# Patient Record
Sex: MALE | NOT HISPANIC OR LATINO
[De-identification: names, ages, dates, MRNs, and addresses within clinical notes are randomized per-mention and may not be internally consistent; named-entity substitution may affect disease eponyms.]

---

## 2018-11-12 ENCOUNTER — APPOINTMENT (OUTPATIENT)
Dept: OPHTHALMOLOGY | Facility: CLINIC | Age: 83
End: 2018-11-12
Payer: MEDICAID

## 2018-11-12 PROCEDURE — 99203 OFFICE O/P NEW LOW 30 MIN: CPT

## 2018-11-19 ENCOUNTER — APPOINTMENT (OUTPATIENT)
Dept: OPHTHALMOLOGY | Facility: CLINIC | Age: 83
End: 2018-11-19

## 2018-11-26 ENCOUNTER — APPOINTMENT (OUTPATIENT)
Dept: OPHTHALMOLOGY | Facility: CLINIC | Age: 83
End: 2018-11-26
Payer: MEDICAID

## 2018-11-26 PROCEDURE — 92136 OPHTHALMIC BIOMETRY: CPT

## 2018-11-26 PROCEDURE — 92014 COMPRE OPH EXAM EST PT 1/>: CPT

## 2019-01-02 ENCOUNTER — APPOINTMENT (OUTPATIENT)
Dept: OPHTHALMOLOGY | Facility: CLINIC | Age: 84
End: 2019-01-02
Payer: MEDICAID

## 2019-01-02 PROCEDURE — 92014 COMPRE OPH EXAM EST PT 1/>: CPT

## 2019-03-26 ENCOUNTER — APPOINTMENT (OUTPATIENT)
Dept: OPHTHALMOLOGY | Facility: CLINIC | Age: 84
End: 2019-03-26
Payer: MEDICAID

## 2019-03-26 PROCEDURE — 92014 COMPRE OPH EXAM EST PT 1/>: CPT

## 2019-04-05 ENCOUNTER — APPOINTMENT (OUTPATIENT)
Dept: OPHTHALMOLOGY | Facility: EYE CENTER | Age: 84
End: 2019-04-05
Payer: MEDICAID

## 2019-04-05 PROCEDURE — 66984 XCAPSL CTRC RMVL W/O ECP: CPT | Mod: RT

## 2019-04-06 ENCOUNTER — APPOINTMENT (OUTPATIENT)
Dept: OPHTHALMOLOGY | Facility: CLINIC | Age: 84
End: 2019-04-06
Payer: MEDICAID

## 2019-04-06 PROCEDURE — 99024 POSTOP FOLLOW-UP VISIT: CPT

## 2019-04-10 ENCOUNTER — APPOINTMENT (OUTPATIENT)
Dept: OPHTHALMOLOGY | Facility: CLINIC | Age: 84
End: 2019-04-10
Payer: MEDICAID

## 2019-04-10 PROCEDURE — 99024 POSTOP FOLLOW-UP VISIT: CPT

## 2019-04-19 ENCOUNTER — NON-APPOINTMENT (OUTPATIENT)
Age: 84
End: 2019-04-19

## 2019-04-19 ENCOUNTER — APPOINTMENT (OUTPATIENT)
Dept: OPHTHALMOLOGY | Facility: EYE CENTER | Age: 84
End: 2019-04-19
Payer: MEDICAID

## 2019-04-19 PROCEDURE — 66984 XCAPSL CTRC RMVL W/O ECP: CPT | Mod: LT,79

## 2019-04-20 ENCOUNTER — NON-APPOINTMENT (OUTPATIENT)
Age: 84
End: 2019-04-20

## 2019-04-20 ENCOUNTER — APPOINTMENT (OUTPATIENT)
Dept: OPHTHALMOLOGY | Facility: CLINIC | Age: 84
End: 2019-04-20
Payer: MEDICAID

## 2019-04-20 PROCEDURE — 99024 POSTOP FOLLOW-UP VISIT: CPT

## 2019-04-29 ENCOUNTER — NON-APPOINTMENT (OUTPATIENT)
Age: 84
End: 2019-04-29

## 2019-04-29 ENCOUNTER — APPOINTMENT (OUTPATIENT)
Dept: OPHTHALMOLOGY | Facility: CLINIC | Age: 84
End: 2019-04-29
Payer: MEDICAID

## 2019-04-29 PROCEDURE — 99024 POSTOP FOLLOW-UP VISIT: CPT

## 2019-05-29 ENCOUNTER — APPOINTMENT (OUTPATIENT)
Dept: OPHTHALMOLOGY | Facility: CLINIC | Age: 84
End: 2019-05-29
Payer: MEDICAID

## 2019-05-29 ENCOUNTER — NON-APPOINTMENT (OUTPATIENT)
Age: 84
End: 2019-05-29

## 2019-05-29 PROCEDURE — 99024 POSTOP FOLLOW-UP VISIT: CPT

## 2019-05-29 PROCEDURE — 92015 DETERMINE REFRACTIVE STATE: CPT

## 2019-06-01 ENCOUNTER — OUTPATIENT (OUTPATIENT)
Dept: OUTPATIENT SERVICES | Facility: HOSPITAL | Age: 84
LOS: 1 days | End: 2019-06-01
Payer: MEDICAID

## 2019-06-01 PROCEDURE — G9001: CPT

## 2019-06-02 ENCOUNTER — EMERGENCY (EMERGENCY)
Facility: HOSPITAL | Age: 84
LOS: 1 days | Discharge: HOME | End: 2019-06-02
Attending: EMERGENCY MEDICINE | Admitting: INTERNAL MEDICINE
Payer: MEDICAID

## 2019-06-02 VITALS
RESPIRATION RATE: 18 BRPM | HEART RATE: 94 BPM | SYSTOLIC BLOOD PRESSURE: 151 MMHG | DIASTOLIC BLOOD PRESSURE: 90 MMHG | OXYGEN SATURATION: 94 % | TEMPERATURE: 98 F

## 2019-06-02 DIAGNOSIS — J44.1 CHRONIC OBSTRUCTIVE PULMONARY DISEASE WITH (ACUTE) EXACERBATION: ICD-10-CM

## 2019-06-02 LAB
ALBUMIN SERPL ELPH-MCNC: 4.1 G/DL — SIGNIFICANT CHANGE UP (ref 3.3–5)
ALP SERPL-CCNC: 56 U/L — SIGNIFICANT CHANGE UP (ref 40–120)
ALT FLD-CCNC: 11 U/L — SIGNIFICANT CHANGE UP (ref 4–41)
ANION GAP SERPL CALC-SCNC: 14 MMO/L — SIGNIFICANT CHANGE UP (ref 7–14)
AST SERPL-CCNC: 15 U/L — SIGNIFICANT CHANGE UP (ref 4–40)
BASE EXCESS BLDV CALC-SCNC: 1 MMOL/L — SIGNIFICANT CHANGE UP
BASOPHILS # BLD AUTO: 0.04 K/UL — SIGNIFICANT CHANGE UP (ref 0–0.2)
BASOPHILS NFR BLD AUTO: 0.4 % — SIGNIFICANT CHANGE UP (ref 0–2)
BILIRUB SERPL-MCNC: 0.8 MG/DL — SIGNIFICANT CHANGE UP (ref 0.2–1.2)
BLOOD GAS VENOUS - CREATININE: 0.93 MG/DL — SIGNIFICANT CHANGE UP (ref 0.5–1.3)
BLOOD GAS VENOUS - FIO2: 21 — SIGNIFICANT CHANGE UP
BUN SERPL-MCNC: 11 MG/DL — SIGNIFICANT CHANGE UP (ref 7–23)
CALCIUM SERPL-MCNC: 9.6 MG/DL — SIGNIFICANT CHANGE UP (ref 8.4–10.5)
CHLORIDE BLDV-SCNC: 96 MMOL/L — SIGNIFICANT CHANGE UP (ref 96–108)
CHLORIDE SERPL-SCNC: 95 MMOL/L — LOW (ref 98–107)
CO2 SERPL-SCNC: 22 MMOL/L — SIGNIFICANT CHANGE UP (ref 22–31)
CREAT SERPL-MCNC: 0.95 MG/DL — SIGNIFICANT CHANGE UP (ref 0.5–1.3)
EOSINOPHIL # BLD AUTO: 0.04 K/UL — SIGNIFICANT CHANGE UP (ref 0–0.5)
EOSINOPHIL NFR BLD AUTO: 0.4 % — SIGNIFICANT CHANGE UP (ref 0–6)
GAS PNL BLDV: 129 MMOL/L — LOW (ref 136–146)
GLUCOSE BLDV-MCNC: 94 MG/DL — SIGNIFICANT CHANGE UP (ref 70–99)
GLUCOSE SERPL-MCNC: 95 MG/DL — SIGNIFICANT CHANGE UP (ref 70–99)
HCO3 BLDV-SCNC: 24 MMOL/L — SIGNIFICANT CHANGE UP (ref 20–27)
HCT VFR BLD CALC: 41.5 % — SIGNIFICANT CHANGE UP (ref 39–50)
HCT VFR BLDV CALC: 46.3 % — SIGNIFICANT CHANGE UP (ref 39–51)
HGB BLD-MCNC: 14 G/DL — SIGNIFICANT CHANGE UP (ref 13–17)
HGB BLDV-MCNC: 15.1 G/DL — SIGNIFICANT CHANGE UP (ref 13–17)
IMM GRANULOCYTES NFR BLD AUTO: 0.2 % — SIGNIFICANT CHANGE UP (ref 0–1.5)
LACTATE BLDV-MCNC: 1.4 MMOL/L — SIGNIFICANT CHANGE UP (ref 0.5–2)
LYMPHOCYTES # BLD AUTO: 1.4 K/UL — SIGNIFICANT CHANGE UP (ref 1–3.3)
LYMPHOCYTES # BLD AUTO: 14.2 % — SIGNIFICANT CHANGE UP (ref 13–44)
MCHC RBC-ENTMCNC: 30.2 PG — SIGNIFICANT CHANGE UP (ref 27–34)
MCHC RBC-ENTMCNC: 33.7 % — SIGNIFICANT CHANGE UP (ref 32–36)
MCV RBC AUTO: 89.4 FL — SIGNIFICANT CHANGE UP (ref 80–100)
MONOCYTES # BLD AUTO: 1.35 K/UL — HIGH (ref 0–0.9)
MONOCYTES NFR BLD AUTO: 13.7 % — SIGNIFICANT CHANGE UP (ref 2–14)
NEUTROPHILS # BLD AUTO: 7.02 K/UL — SIGNIFICANT CHANGE UP (ref 1.8–7.4)
NEUTROPHILS NFR BLD AUTO: 71.1 % — SIGNIFICANT CHANGE UP (ref 43–77)
NRBC # FLD: 0 K/UL — SIGNIFICANT CHANGE UP (ref 0–0)
PCO2 BLDV: 44 MMHG — SIGNIFICANT CHANGE UP (ref 41–51)
PH BLDV: 7.38 PH — SIGNIFICANT CHANGE UP (ref 7.32–7.43)
PLATELET # BLD AUTO: 210 K/UL — SIGNIFICANT CHANGE UP (ref 150–400)
PMV BLD: 8.9 FL — SIGNIFICANT CHANGE UP (ref 7–13)
PO2 BLDV: 27 MMHG — LOW (ref 35–40)
POTASSIUM BLDV-SCNC: 3.8 MMOL/L — SIGNIFICANT CHANGE UP (ref 3.4–4.5)
POTASSIUM SERPL-MCNC: 4.1 MMOL/L — SIGNIFICANT CHANGE UP (ref 3.5–5.3)
POTASSIUM SERPL-SCNC: 4.1 MMOL/L — SIGNIFICANT CHANGE UP (ref 3.5–5.3)
PROT SERPL-MCNC: 6.8 G/DL — SIGNIFICANT CHANGE UP (ref 6–8.3)
RBC # BLD: 4.64 M/UL — SIGNIFICANT CHANGE UP (ref 4.2–5.8)
RBC # FLD: 12.8 % — SIGNIFICANT CHANGE UP (ref 10.3–14.5)
SAO2 % BLDV: 43.3 % — LOW (ref 60–85)
SODIUM SERPL-SCNC: 131 MMOL/L — LOW (ref 135–145)
TROPONIN T, HIGH SENSITIVITY: 24 NG/L — SIGNIFICANT CHANGE UP (ref ?–14)
TROPONIN T, HIGH SENSITIVITY: 26 NG/L — SIGNIFICANT CHANGE UP (ref ?–14)
WBC # BLD: 9.87 K/UL — SIGNIFICANT CHANGE UP (ref 3.8–10.5)
WBC # FLD AUTO: 9.87 K/UL — SIGNIFICANT CHANGE UP (ref 3.8–10.5)

## 2019-06-02 PROCEDURE — 99285 EMERGENCY DEPT VISIT HI MDM: CPT

## 2019-06-02 RX ORDER — IPRATROPIUM/ALBUTEROL SULFATE 18-103MCG
3 AEROSOL WITH ADAPTER (GRAM) INHALATION ONCE
Refills: 0 | Status: COMPLETED | OUTPATIENT
Start: 2019-06-02 | End: 2019-06-02

## 2019-06-02 RX ORDER — SODIUM CHLORIDE 9 MG/ML
1000 INJECTION INTRAMUSCULAR; INTRAVENOUS; SUBCUTANEOUS ONCE
Refills: 0 | Status: COMPLETED | OUTPATIENT
Start: 2019-06-02 | End: 2019-06-02

## 2019-06-02 RX ADMIN — Medication 3 MILLILITER(S): at 20:32

## 2019-06-02 RX ADMIN — SODIUM CHLORIDE 1000 MILLILITER(S): 9 INJECTION INTRAMUSCULAR; INTRAVENOUS; SUBCUTANEOUS at 20:31

## 2019-06-02 RX ADMIN — Medication 125 MILLIGRAM(S): at 20:31

## 2019-06-02 NOTE — H&P ADULT - NSICDXPASTMEDICALHX_GEN_ALL_CORE_FT
PAST MEDICAL HISTORY:  BPH (benign prostatic hyperplasia)     COPD (chronic obstructive pulmonary disease)     Zenkers diverticulum

## 2019-06-02 NOTE — H&P ADULT - PROBLEM SELECTOR PLAN 5
-pt requesting daily b12 injections to help with generalized weakness  -will order meds based on pt list; verify with email to pharmacy for med rec

## 2019-06-02 NOTE — ED PROVIDER NOTE - CLINICAL SUMMARY MEDICAL DECISION MAKING FREE TEXT BOX
84yo M w/ pmhx of COPD, HTN, HLD presents with worsening SOB for 1 week. Pt reports desatting to low 80's after just going ot the bathroom. (+) subjective fevers. Pt also reports some left sided intermittent chest pressure going to the axilla. Pt is not on any oxygen at home. Denies any other symptoms. PE is remarkable for mild wheezing, will treat with Duonebs, Steroids and obtain labs and reassessed. 84yo M w/ pmhx of COPD, HTN, HLD presents with worsening SOB for 1 week. Pt reports desatting to low 80's after just going ot the bathroom. (+) subjective fevers. Pt also reports some left sided intermittent chest pressure going to the axilla. Pt is not on any oxygen at home. Denies any other symptoms. PE is remarkable for mild wheezing, will treat with Duonebs, Steroids and obtain labs and reassessed.  Follow up: Pt was reassessed after getting Duonebs, still feeling SOB. will be admitted to pulmonary unit

## 2019-06-02 NOTE — ED PROVIDER NOTE - PROGRESS NOTE DETAILS
Pt is still feeling short of breath after Duoneb, afraid to do a road test, will be admitted to pulmonary unit.

## 2019-06-02 NOTE — H&P ADULT - NSHPREVIEWOFSYSTEMS_GEN_ALL_CORE
Review of Systems:   CONSTITUTIONAL: No fever, weight loss, or fatigue  EYES: No eye pain, visual disturbances, or discharge  ENMT:  No difficulty hearing, tinnitus, vertigo; No sinus or throat pain  NECK: No pain or stiffness  RESPIRATORY: No cough, wheezing, chills or hemoptysis; [place]  CARDIOVASCULAR: No active chest pain. No palpitations, dizziness, or leg swelling  GASTROINTESTINAL: No abdominal or epigastric pain. No nausea, vomiting, or hematemesis; No diarrhea or constipation. No melena or hematochezia.  GENITOURINARY: No dysuria, frequency, hematuria, or incontinence  NEUROLOGICAL: No headaches  SKIN: No itching, burning, rashes, or lesions   LYMPH NODES: No enlarged glands  ENDOCRINE: No heat or cold intolerance; No hair loss  MUSCULOSKELETAL: No joint pain or swelling; No muscle, back, or extremity pain

## 2019-06-02 NOTE — ED PROVIDER NOTE - OBJECTIVE STATEMENT
84yo M w/ pmhx of COPD, HTN, HLD presents with worsening SOB for 1 week. Pt reports desatting to low 80's after just going ot the bathroom. (+) subjective fevers. Pt also reports some left sided intermittent chest pressure going to the axilla. Pt is not on any oxygen at home. Denies any other symptoms.

## 2019-06-02 NOTE — ED PROVIDER NOTE - ATTENDING CONTRIBUTION TO CARE
85m h/o COPD presnts from home with family for worsenign SOB. Desats at home to 80% with activity. Nebulizer at home not relieving symptoms. ALso with some left sided chest pressure. Slight cough.    Patient denies headache, vision changes, focal weakness/numbness, neck pain, fever, back pain, abd pain, nausea, vomiting, diarrhea, constipation, blood in stool, dysuria, rash, trauma, falls. Patient is well appearing, conversant, cooperative, smiling, head atraumatic, neck supple with full range of motion, oropharynx clear, lungs tight with wheeze, speaking short phrases, heart clear, no murmurs, distal pulses equal in all 4 extremities, abdomen soft nontender nondistended with no masses, no leg edema, no calf tenderness, nonfocal neurologic exam.     The patient's risk factors for ACS were reviewed as well as the EKG.  The CXR assists in r/o Pneumonia, Pneumothorax, Esophageal Tears.  The patient does not appear to have a Pulmonary Embolism based on the Wells Score and there is no apparent DVT.  There are no signs of Pericarditis, Endocarditis, or Myocarditis based on risk factor analysis.  There is no fever.  There does not appear to be an Aortic Dissection either based on history, physical exam, and signs. Likely COPD exacerbation. Nebs, steroids, CXR, EKG, troponin. REassess.

## 2019-06-02 NOTE — ED ADULT TRIAGE NOTE - CHIEF COMPLAINT QUOTE
Pt with PMH of COPD, c/o worsening SOB and O2 sat dropping to 80s when ambulating. Pt's reported normal O2 sat mid to low 90s. Pt also reports occasional left sided chest pain, last felt under left arm. Denies fever, denies cough.

## 2019-06-02 NOTE — H&P ADULT - NSHPPHYSICALEXAM_GEN_ALL_CORE
PHYSICAL EXAM:      Constitutional: NAD, well-groomed, well-developed  HEENT:  EOMI, Normal Hearing  Neck: No LAD, No JVD  Back: Normal spine flexure, No CVA tenderness  Respiratory: CTAB, mild expiratory wheezing at upper lung field  Cardiovascular: S1 and S2, RRR,  Gastrointestinal: BS+, soft, NT/ND  Extremities: No peripheral edema  Vascular: 2+ peripheral pulses  Neurological: A/O x 3, no focal deficits  Psychiatric: Normal mood, normal affect  Musculoskeletal: 5/5 strength b/l upper and lower extremities  Skin: No rashes

## 2019-06-02 NOTE — H&P ADULT - PROBLEM SELECTOR PLAN 4
IMPROVE VTE Individual Risk Assessment    RISK                                                          Points  [] Previous VTE                                           3  [] Thrombophilia                                        2  [] Lower limb paralysis                              2   [] Current Cancer                                       2   [] Immobilization > 24 hrs                        1  [] ICU/CCU stay > 24 hours                       1  [x] Age > 60                                                   1    IMPROVE VTE Score: scd

## 2019-06-02 NOTE — H&P ADULT - NSHPLABSRESULTS_GEN_ALL_CORE
Vital Signs Last 24 Hrs  T(C): 36.8 (03 Jun 2019 00:16), Max: 36.9 (02 Jun 2019 18:57)  T(F): 98.3 (03 Jun 2019 00:16), Max: 98.4 (02 Jun 2019 18:57)  HR: 92 (03 Jun 2019 00:16) (88 - 94)  BP: 130/65 (03 Jun 2019 00:16) (130/65 - 151/90)  BP(mean): --  RR: 16 (03 Jun 2019 00:16) (16 - 18)  SpO2: 98% (03 Jun 2019 00:16) (94% - 98%)                            14.0   9.87  )-----------( 210      ( 02 Jun 2019 19:53 )             41.5     06-02    131<L>  |  95<L>  |  11  ----------------------------<  95  4.1   |  22  |  0.95    Ca    9.6      02 Jun 2019 19:53    TPro  6.8  /  Alb  4.1  /  TBili  0.8  /  DBili  x   /  AST  15  /  ALT  11  /  AlkPhos  56  06-02    CAPILLARY BLOOD GLUCOSE

## 2019-06-02 NOTE — H&P ADULT - HISTORY OF PRESENT ILLNESS
84 yo m with copd not on home O2, chronic dysphagia 2/2 Zenker's diverticulum, BPH, HTN. Pt reports 2 weeks of sob,  more pronounced on exertion. Home O2 monitor drops into low 80's when walking short distances. Denies cough or nasal congestion but specifies that his copd flairs typically present this way. Denies current cp, but reports left-sided radiation to axilla 3-4 days ago associated with [place]. Denies h/o heart disease, reports undergoing stress test 6 months ago; negative? Denies unilateral leg swelling, h/o clots or hemoptysis. Max HR 94. Prelim cxr negative, RVP pending. Afebreile here thus far 84 yo m former smoker, with copd not on home O2, chronic dysphagia 2/2 Zenker's diverticulum, BPH, HTN. Pt reports 2 weeks of sob,  more pronounced on exertion. Home O2 monitor drops into low 80's when walking short distances. Denies cough or nasal congestion but specifies that his copd flairs typically present this way. Denies current cp, but reports left-sided radiation to axilla 3-4 days ago associated with [place]. Denies h/o heart disease, reports undergoing stress test 6 months ago; negative? Denies unilateral leg swelling, h/o clots or hemoptysis. Max HR 94. Prelim cxr negative, RVP pending. Afebreile here thus far

## 2019-06-02 NOTE — H&P ADULT - PROBLEM SELECTOR PLAN 1
-atypical presentation; will c/w nebs, steroids for now, hold abx  -low modified wells score; ekg,  HS trop not suggestive of ACS; would contact PMD to verify pt has undergone recent stress test, and that current symptoms are typical for his copd flairs

## 2019-06-02 NOTE — ED ADULT NURSE NOTE - NSIMPLEMENTINTERV_GEN_ALL_ED
Implemented All Universal Safety Interventions:  [place] to call system. Call bell, personal items and telephone within reach. Instruct patient to call for assistance. Room bathroom lighting operational. Non-slip footwear when patient is off stretcher. Physically safe environment: no spills, clutter or unnecessary equipment. Stretcher in lowest position, wheels locked, appropriate side rails in place.

## 2019-06-03 ENCOUNTER — TRANSCRIPTION ENCOUNTER (OUTPATIENT)
Age: 84
End: 2019-06-03

## 2019-06-03 VITALS — OXYGEN SATURATION: 97 %

## 2019-06-03 DIAGNOSIS — N40.0 BENIGN PROSTATIC HYPERPLASIA WITHOUT LOWER URINARY TRACT SYMPTOMS: ICD-10-CM

## 2019-06-03 DIAGNOSIS — I10 ESSENTIAL (PRIMARY) HYPERTENSION: ICD-10-CM

## 2019-06-03 DIAGNOSIS — J44.1 CHRONIC OBSTRUCTIVE PULMONARY DISEASE WITH (ACUTE) EXACERBATION: ICD-10-CM

## 2019-06-03 DIAGNOSIS — Z29.9 ENCOUNTER FOR PROPHYLACTIC MEASURES, UNSPECIFIED: ICD-10-CM

## 2019-06-03 DIAGNOSIS — Z79.899 OTHER LONG TERM (CURRENT) DRUG THERAPY: ICD-10-CM

## 2019-06-03 LAB
APTT BLD: 31 SEC — SIGNIFICANT CHANGE UP (ref 27.5–36.3)
B PERT DNA SPEC QL NAA+PROBE: NOT DETECTED — SIGNIFICANT CHANGE UP
BASE EXCESS BLDV CALC-SCNC: -1.9 MMOL/L — SIGNIFICANT CHANGE UP
C PNEUM DNA SPEC QL NAA+PROBE: NOT DETECTED — SIGNIFICANT CHANGE UP
FLUAV H1 2009 PAND RNA SPEC QL NAA+PROBE: NOT DETECTED — SIGNIFICANT CHANGE UP
FLUAV H1 RNA SPEC QL NAA+PROBE: NOT DETECTED — SIGNIFICANT CHANGE UP
FLUAV H3 RNA SPEC QL NAA+PROBE: NOT DETECTED — SIGNIFICANT CHANGE UP
FLUAV SUBTYP SPEC NAA+PROBE: NOT DETECTED — SIGNIFICANT CHANGE UP
FLUBV RNA SPEC QL NAA+PROBE: NOT DETECTED — SIGNIFICANT CHANGE UP
GAS PNL BLDV: 129 MMOL/L — LOW (ref 136–146)
GLUCOSE BLDV-MCNC: 183 MG/DL — HIGH (ref 70–99)
HADV DNA SPEC QL NAA+PROBE: NOT DETECTED — SIGNIFICANT CHANGE UP
HCO3 BLDV-SCNC: 23 MMOL/L — SIGNIFICANT CHANGE UP (ref 20–27)
HCOV PNL SPEC NAA+PROBE: SIGNIFICANT CHANGE UP
HCT VFR BLD CALC: 37.8 % — LOW (ref 39–50)
HCT VFR BLDV CALC: 41.5 % — SIGNIFICANT CHANGE UP (ref 39–51)
HGB BLD-MCNC: 13.1 G/DL — SIGNIFICANT CHANGE UP (ref 13–17)
HGB BLDV-MCNC: 13.5 G/DL — SIGNIFICANT CHANGE UP (ref 13–17)
HMPV RNA SPEC QL NAA+PROBE: NOT DETECTED — SIGNIFICANT CHANGE UP
HPIV1 RNA SPEC QL NAA+PROBE: NOT DETECTED — SIGNIFICANT CHANGE UP
HPIV2 RNA SPEC QL NAA+PROBE: NOT DETECTED — SIGNIFICANT CHANGE UP
HPIV3 RNA SPEC QL NAA+PROBE: NOT DETECTED — SIGNIFICANT CHANGE UP
HPIV4 RNA SPEC QL NAA+PROBE: NOT DETECTED — SIGNIFICANT CHANGE UP
INR BLD: 1.11 — SIGNIFICANT CHANGE UP (ref 0.88–1.17)
MCHC RBC-ENTMCNC: 30.3 PG — SIGNIFICANT CHANGE UP (ref 27–34)
MCHC RBC-ENTMCNC: 34.7 % — SIGNIFICANT CHANGE UP (ref 32–36)
MCV RBC AUTO: 87.5 FL — SIGNIFICANT CHANGE UP (ref 80–100)
NRBC # FLD: 0 K/UL — SIGNIFICANT CHANGE UP (ref 0–0)
PCO2 BLDV: 36 MMHG — LOW (ref 41–51)
PH BLDV: 7.4 PH — SIGNIFICANT CHANGE UP (ref 7.32–7.43)
PLATELET # BLD AUTO: 188 K/UL — SIGNIFICANT CHANGE UP (ref 150–400)
PMV BLD: 9.1 FL — SIGNIFICANT CHANGE UP (ref 7–13)
PO2 BLDV: 86 MMHG — HIGH (ref 35–40)
POTASSIUM BLDV-SCNC: 4 MMOL/L — SIGNIFICANT CHANGE UP (ref 3.4–4.5)
PROTHROM AB SERPL-ACNC: 12.7 SEC — SIGNIFICANT CHANGE UP (ref 9.8–13.1)
RBC # BLD: 4.32 M/UL — SIGNIFICANT CHANGE UP (ref 4.2–5.8)
RBC # FLD: 12.8 % — SIGNIFICANT CHANGE UP (ref 10.3–14.5)
RSV RNA SPEC QL NAA+PROBE: NOT DETECTED — SIGNIFICANT CHANGE UP
RV+EV RNA SPEC QL NAA+PROBE: NOT DETECTED — SIGNIFICANT CHANGE UP
SAO2 % BLDV: 96.2 % — HIGH (ref 60–85)
WBC # BLD: 4.05 K/UL — SIGNIFICANT CHANGE UP (ref 3.8–10.5)
WBC # FLD AUTO: 4.05 K/UL — SIGNIFICANT CHANGE UP (ref 3.8–10.5)

## 2019-06-03 RX ORDER — IPRATROPIUM/ALBUTEROL SULFATE 18-103MCG
3 AEROSOL WITH ADAPTER (GRAM) INHALATION EVERY 6 HOURS
Refills: 0 | Status: COMPLETED | OUTPATIENT
Start: 2019-06-03 | End: 2019-06-03

## 2019-06-03 RX ORDER — TAMSULOSIN HYDROCHLORIDE 0.4 MG/1
0.4 CAPSULE ORAL AT BEDTIME
Refills: 0 | Status: DISCONTINUED | OUTPATIENT
Start: 2019-06-03 | End: 2019-06-03

## 2019-06-03 RX ORDER — PREGABALIN 225 MG/1
1000 CAPSULE ORAL DAILY
Refills: 0 | Status: DISCONTINUED | OUTPATIENT
Start: 2019-06-03 | End: 2019-06-03

## 2019-06-03 RX ORDER — ALBUTEROL 90 UG/1
2 AEROSOL, METERED ORAL
Qty: 1 | Refills: 0
Start: 2019-06-03

## 2019-06-03 RX ORDER — BUDESONIDE AND FORMOTEROL FUMARATE DIHYDRATE 160; 4.5 UG/1; UG/1
2 AEROSOL RESPIRATORY (INHALATION)
Refills: 0 | Status: DISCONTINUED | OUTPATIENT
Start: 2019-06-03 | End: 2019-06-03

## 2019-06-03 RX ORDER — LISINOPRIL 2.5 MG/1
10 TABLET ORAL DAILY
Refills: 0 | Status: DISCONTINUED | OUTPATIENT
Start: 2019-06-03 | End: 2019-06-03

## 2019-06-03 RX ORDER — ATORVASTATIN CALCIUM 80 MG/1
10 TABLET, FILM COATED ORAL AT BEDTIME
Refills: 0 | Status: DISCONTINUED | OUTPATIENT
Start: 2019-06-03 | End: 2019-06-03

## 2019-06-03 RX ORDER — MONTELUKAST 4 MG/1
10 TABLET, CHEWABLE ORAL DAILY
Refills: 0 | Status: DISCONTINUED | OUTPATIENT
Start: 2019-06-03 | End: 2019-06-03

## 2019-06-03 RX ORDER — PANTOPRAZOLE SODIUM 20 MG/1
40 TABLET, DELAYED RELEASE ORAL
Refills: 0 | Status: DISCONTINUED | OUTPATIENT
Start: 2019-06-03 | End: 2019-06-03

## 2019-06-03 RX ORDER — TIOTROPIUM BROMIDE 18 UG/1
1 CAPSULE ORAL; RESPIRATORY (INHALATION) DAILY
Refills: 0 | Status: DISCONTINUED | OUTPATIENT
Start: 2019-06-03 | End: 2019-06-03

## 2019-06-03 RX ADMIN — Medication 30 MILLILITER(S): at 07:50

## 2019-06-03 RX ADMIN — TIOTROPIUM BROMIDE 1 CAPSULE(S): 18 CAPSULE ORAL; RESPIRATORY (INHALATION) at 10:55

## 2019-06-03 RX ADMIN — Medication 3 MILLILITER(S): at 04:04

## 2019-06-03 RX ADMIN — Medication 40 MILLIGRAM(S): at 14:03

## 2019-06-03 RX ADMIN — LISINOPRIL 10 MILLIGRAM(S): 2.5 TABLET ORAL at 05:39

## 2019-06-03 RX ADMIN — Medication 40 MILLIGRAM(S): at 05:39

## 2019-06-03 RX ADMIN — PREGABALIN 1000 MICROGRAM(S): 225 CAPSULE ORAL at 14:03

## 2019-06-03 RX ADMIN — PANTOPRAZOLE SODIUM 40 MILLIGRAM(S): 20 TABLET, DELAYED RELEASE ORAL at 07:50

## 2019-06-03 RX ADMIN — Medication 3 MILLILITER(S): at 16:15

## 2019-06-03 NOTE — DISCHARGE NOTE PROVIDER - NSDCCPCAREPLAN_GEN_ALL_CORE_FT
PRINCIPAL DISCHARGE DIAGNOSIS  Diagnosis: COPD exacerbation  Assessment and Plan of Treatment: - New home 02  - Follow pulse ox at home   -COntinue inhalers      SECONDARY DISCHARGE DIAGNOSES  Diagnosis: Essential hypertension  Assessment and Plan of Treatment: Essential hypertension  Continue blood pressure medication regimen as directed. Monitor for any visual changes, headaches or dizziness.  Monitor blood pressure regularly.  Follow up with your PCP for further management for high blood pressure. PRINCIPAL DISCHARGE DIAGNOSIS  Diagnosis: COPD exacerbation  Assessment and Plan of Treatment: - New home 02  - Follow pulse ox at home   -Continue inhalers as directed   -Follow up with pulmonolgist Dr. Aguilar Salamanca       SECONDARY DISCHARGE DIAGNOSES  Diagnosis: Essential hypertension  Assessment and Plan of Treatment: Essential hypertension  Continue blood pressure medication regimen as directed. Monitor for any visual changes, headaches or dizziness.  Monitor blood pressure regularly.  Follow up with your PCP for further management for high blood pressure.      Diagnosis: COPD exacerbation  Assessment and Plan of Treatment: COPD exacerbation  Continue medications    Diagnosis: Zenker's diverticulum  Assessment and Plan of Treatment: continue medication   Follow up per your PMD recommendation

## 2019-06-03 NOTE — DISCHARGE NOTE PROVIDER - HOSPITAL COURSE
84 yo m former smoker, with copd not on home O2, chronic dysphagia 2/2 Zenker's diverticulum, BPH, HTN. Pt reports 2 weeks of sob,  more pronounced on exertion. Home O2 monitor drops into low 80's when walking short distances. Denies cough or nasal congestion but specifies that his copd flairs typically present this way. Denies current cp, but reports left-sided radiation to axilla 3-4 days ago associated with [place]. Denies h/o heart disease, reports undergoing stress test 6 months ago; negative? Denies unilateral leg swelling, h/o clots or hemoptysis. Max HR 94. Prelim cxr negative, RVP negative, no fevers      Pt tx with nebulizers and still SOB admitted to [facility]    Pt with + [place], room air sat 93% , however when pt ambulates on room air he desats to 83% and returns to 95% with 2L n/c 02 Pt will have home 02 84 yo m former smoker, with copd not on home O2, chronic dysphagia 2/2 Zenker's diverticulum, BPH, HTN. Pt reports 2 weeks of sob,  more pronounced on exertion. Home O2 monitor drops into low 80's when walking short distances. Denies cough or nasal congestion but specifies that his copd flairs typically present this way. Denies current cp, but reports left-sided radiation to axilla 3-4 days ago associated with [place]. Denies h/o heart disease, reports undergoing stress test 6 months ago; negative? Denies unilateral leg swelling, h/o clots or hemoptysis. Max HR 94. Prelim cxr negative, RVP negative, no fevers      Pt tx with nebulizers and still SOB admitted to [facility]    Pt with + [place], room air sat 93% , however when pt ambulates on room air he desats to 83% and returns to 95% with 2L n/c 02 Pt will have home 02     Pt afebrile, improved breathing and will fu OP with pulmonologist for further work up 86 yo m former smoker, with copd not on home O2, chronic dysphagia 2/2 Zenker's diverticulum, BPH, HTN. Pt reports 2 weeks of sob,  more pronounced on exertion. Home O2 monitor drops into low 80's when walking short distances. Denies cough or nasal congestion but specifies that his copd flairs typically present this way. Denies current cp, but reports left-sided radiation to axilla 3-4 days ago associated with [place]. Denies h/o heart disease, reports undergoing stress test 6 months ago; negative? Denies unilateral leg swelling, h/o clots or hemoptysis. Max HR 94. Prelim cxr negative, RVP negative, no fevers      Pt tx with nebulizers and still SOB admitted to [facility]    Pt with + [place], room air sat 93% , however when pt ambulates on room air he desats to 83% and returns to 95% with 2L n/c 02 Pt will have home 02     Pt afebrile, improved breathing and will fu OP with pulmonologist for further work up     Continue with home medications /inhalers until seen by pulmonologist     02 for home use         DIspo: home with 02 84 yo m former smoker, with copd not on home O2, chronic dysphagia 2/2 Zenker's diverticulum, BPH, HTN. Pt reports 2 weeks of sob,  more pronounced on exertion. Home O2 monitor drops into low 80's when walking short distances. Denies cough or nasal congestion but specifies that his copd flairs typically present this way. Denies current cp, but reports left-sided radiation to axilla 3-4 days ago associated with [place]. Denies h/o heart disease, reports undergoing stress test 6 months ago; negative? Denies unilateral leg swelling, h/o clots or hemoptysis. Max HR 94. Prelim cxr negative, RVP negative, no fevers      Pt tx with nebulizers and still SOB admitted to [facility]    Pt with + [place], room air sat 93% , however when pt ambulates on room air he desats to 83% and returns to 95% with 2L n/c 02 Pt will have home 02     Pt afebrile, improved breathing now and will fu OP with pulmonologist for further work up     Continue with home medications /inhalers until seen by pulmonologist     02 for home use         DIspo: home with 02

## 2019-06-03 NOTE — DISCHARGE NOTE PROVIDER - CARE PROVIDER_API CALL
Aguilar Ramires)  Internal Medicine; Pulmonary Disease  [address], [address] [place] [address]  Phone: [phone]  Fax: [phone]  Follow Up Time:     ROSALINE Rosario  Phone: (   )    -  Fax: (   )    -  Follow Up Time:

## 2019-06-03 NOTE — PROGRESS NOTE ADULT - PROBLEM SELECTOR PLAN 1
-atypical presentation; will c/w nebs, steroids for now, hold abx  -low modified wells score; ekg,  HS trop not suggestive of ACS; would contact PMD to verify pt has undergone recent stress test, and that current symptoms are typical for his copd flairs -atypical presentation; will c/w nebs, steroids for now, hold abx  - Pt with RA at rest sat of 94%, during exercise pt desats to 83% room air and improves to 95% with 2L n/c .   - Pt will require OP fu with pulmonologist for further testing

## 2019-06-03 NOTE — PROGRESS NOTE ADULT - ATTENDING COMMENTS
Agree with above.  Likely COPD history with what appears to be mild hypoxemia on exertion (per patient, spO2 88–89% on room air when ambulating).  Now with worsening dyspnea and worsening hypoxemia on minimal exertion (spO2 mid-80s).  NEBs and steroids.  Will need pulmonary O/P F/U for PFTs to determine severity of disease (mMRC 3 with annual exacerbations). May benefit from LAMA / LABA (currently on LABA / ICS)  Patient desats to 83% on exertion (room air) and qualifies for supplemental oxygen.  Symptomatically improved.  Discharge planning pending O2 delivery.

## 2019-06-03 NOTE — DISCHARGE NOTE NURSING/CASE MANAGEMENT/SOCIAL WORK - NSDCDPATPORTLINK_GEN_ALL_CORE
You can access the [facility] Portal, offered by [facility], by registering with the following website: [email or web address]

## 2019-06-03 NOTE — PROGRESS NOTE ADULT - PROBLEM SELECTOR PLAN 5
-pt requesting daily b12 injections to help with generalized weakness  -will order meds based on pt list; verify with email to pharmacy for med rec -pt requesting daily b12 injections to help with generalized weakness  -will order meds based on pt list

## 2019-06-03 NOTE — PROGRESS NOTE ADULT - SUBJECTIVE AND OBJECTIVE BOX
CHIEF COMPLAINT:    Interval Events:    REVIEW OF SYSTEMS:  Constitutional:   Eyes:  ENT:  CV:  Resp:  GI:  :  MSK:  Integumentary:  Neurological:  Psychiatric:  Endocrine:  Hematologic/Lymphatic:  Allergic/Immunologic:  [ ] All other systems negative  [ ] Unable to assess ROS because ________    OBJECTIVE:  ICU Vital Signs Last 24 Hrs  T(C): 36.4 (03 Jun 2019 05:37), Max: 36.9 (02 Jun 2019 18:57)  T(F): 97.5 (03 Jun 2019 05:37), Max: 98.4 (02 Jun 2019 18:57)  HR: 76 (03 Jun 2019 05:37) (72 - 94)  BP: 178/93 (03 Jun 2019 05:37) (130/65 - 178/93)  BP(mean): --  ABP: --  ABP(mean): --  RR: 20 (03 Jun 2019 05:37) (16 - 20)  SpO2: 97% (03 Jun 2019 05:37) (94% - 99%)        CAPILLARY BLOOD GLUCOSE          PHYSICAL EXAM:  General:   HEENT:   Lymph Nodes:  Neck:   Respiratory:   Cardiovascular:   Abdomen:   Extremities:   Skin:   Neurological:  Psychiatry:    HOSPITAL MEDICATIONS:  MEDICATIONS  (STANDING):  ALBUTerol/ipratropium for Nebulization 3 milliLiter(s) Nebulizer every 6 hours  atorvastatin 10 milliGRAM(s) Oral at bedtime  cyanocobalamin Injectable 1000 MICROGram(s) IntraMuscular daily  lisinopril 10 milliGRAM(s) Oral daily  methylPREDNISolone sodium succinate Injectable 40 milliGRAM(s) IV Push every 8 hours  montelukast 10 milliGRAM(s) Oral daily  pantoprazole    Tablet 40 milliGRAM(s) Oral before breakfast  tamsulosin 0.4 milliGRAM(s) Oral at bedtime  tiotropium 18 MICROgram(s) Capsule 1 Capsule(s) Inhalation daily    MEDICATIONS  (PRN):  aluminum hydroxide/magnesium hydroxide/simethicone Suspension 30 milliLiter(s) Oral every 6 hours PRN Dyspepsia      LABS:                        13.1   4.05  )-----------( 188      ( 03 Jun 2019 06:30 )             37.8     06-02    131<L>  |  95<L>  |  11  ----------------------------<  95  4.1   |  22  |  0.95    Ca    9.6      02 Jun 2019 19:53    TPro  6.8  /  Alb  4.1  /  TBili  0.8  /  DBili  x   /  AST  15  /  ALT  11  /  AlkPhos  56  06-02    PT/INR - ( 03 Jun 2019 06:30 )   PT: 12.7 SEC;   INR: 1.11          PTT - ( 03 Jun 2019 06:30 )  PTT:31.0 SEC      Venous Blood Gas:  06-03 @ 06:30  7.40/36/86/23/96.2  VBG Lactate: --  Venous Blood Gas:  06-02 @ 19:53  7.38/44/27/24/43.3  VBG Lactate: 1.4      MICROBIOLOGY:     RADIOLOGY:  [ ] Reviewed and interpreted by me    PULMONARY FUNCTION TESTS:    EKG: CHIEF COMPLAINT: Patient is a 85y old  Male who presents with a chief complaint of copd exacerbation (03 Jun 2019 07:43)      Interval Events: admitted     REVIEW OF SYSTEMS:  Constitutional: No fever or chills, feels weak , asking for B12 shots    CV: Denies   Resp: + [place]   GI: Heartburn   MSK: denies     [x ] All other systems negative  [ ] Unable to assess ROS because ________    OBJECTIVE:  ICU Vital Signs Last 24 Hrs  T(C): 36.4 (03 Jun 2019 05:37), Max: 36.9 (02 Jun 2019 18:57)  T(F): 97.5 (03 Jun 2019 05:37), Max: 98.4 (02 Jun 2019 18:57)  HR: 76 (03 Jun 2019 05:37) (72 - 94)  BP: 178/93 (03 Jun 2019 05:37) (130/65 - 178/93)  BP(mean): --  ABP: --  ABP(mean): --  RR: 20 (03 Jun 2019 05:37) (16 - 20)  SpO2: 97% (03 Jun 2019 05:37) (94% - 99%)        CAPILLARY BLOOD GLUCOSE      HOSPITAL MEDICATIONS:  MEDICATIONS  (STANDING):  ALBUTerol/ipratropium for Nebulization 3 milliLiter(s) Nebulizer every 6 hours  atorvastatin 10 milliGRAM(s) Oral at bedtime  cyanocobalamin Injectable 1000 MICROGram(s) IntraMuscular daily  lisinopril 10 milliGRAM(s) Oral daily  methylPREDNISolone sodium succinate Injectable 40 milliGRAM(s) IV Push every 8 hours  montelukast 10 milliGRAM(s) Oral daily  pantoprazole    Tablet 40 milliGRAM(s) Oral before breakfast  tamsulosin 0.4 milliGRAM(s) Oral at bedtime  tiotropium 18 MICROgram(s) Capsule 1 Capsule(s) Inhalation daily    MEDICATIONS  (PRN):  aluminum hydroxide/magnesium hydroxide/simethicone Suspension 30 milliLiter(s) Oral every 6 hours PRN Dyspepsia      LABS:                        13.1   4.05  )-----------( 188      ( 03 Jun 2019 06:30 )             37.8     06-02    131<L>  |  95<L>  |  11  ----------------------------<  95  4.1   |  22  |  0.95    Ca    9.6      02 Jun 2019 19:53    TPro  6.8  /  Alb  4.1  /  TBili  0.8  /  DBili  x   /  AST  15  /  ALT  11  /  AlkPhos  56  06-02    PT/INR - ( 03 Jun 2019 06:30 )   PT: 12.7 SEC;   INR: 1.11          PTT - ( 03 Jun 2019 06:30 )  PTT:31.0 SEC      Venous Blood Gas:  06-03 @ 06:30  7.40/36/86/23/96.2  VBG Lactate: --  Venous Blood Gas:  06-02 @ 19:53  7.38/44/27/24/43.3  VBG Lactate: 1.4      MICROBIOLOGY:     RADIOLOGY:  [ ] Reviewed and interpreted by me    PULMONARY FUNCTION TESTS:    EKG:

## 2019-06-03 NOTE — DISCHARGE NOTE NURSING/CASE MANAGEMENT/SOCIAL WORK - NSDCDMENAME_GEN_ALL_CORE_FT
[facility] [phone]  A Home Oxygen Concentrator w/Home Fill Portable System will be delivered to your home today. A  Portable Oxygen Tank will be delivered to your Hospital Room for Transport Home.

## 2019-06-03 NOTE — DISCHARGE NOTE PROVIDER - PROVIDER TOKENS
PROVIDER:[TOKEN:[[id number]]],FREE:[LAST:[[place]],FIRST:[F],PHONE:[(   )    -],FAX:[(   )    -]]

## 2019-06-04 DIAGNOSIS — Z71.89 OTHER SPECIFIED COUNSELING: ICD-10-CM

## 2019-06-06 RX ORDER — TAMSULOSIN HYDROCHLORIDE 0.4 MG/1
1 CAPSULE ORAL
Qty: 0 | Refills: 0 | DISCHARGE

## 2019-06-06 RX ORDER — ATORVASTATIN CALCIUM 80 MG/1
1 TABLET, FILM COATED ORAL
Qty: 0 | Refills: 0 | DISCHARGE

## 2019-06-06 RX ORDER — TIOTROPIUM BROMIDE 18 UG/1
1 CAPSULE ORAL; RESPIRATORY (INHALATION)
Qty: 0 | Refills: 0 | DISCHARGE

## 2019-06-06 RX ORDER — RANITIDINE HYDROCHLORIDE 150 MG/1
1 TABLET, FILM COATED ORAL
Qty: 0 | Refills: 0 | DISCHARGE

## 2019-06-06 RX ORDER — LISINOPRIL 2.5 MG/1
1 TABLET ORAL
Qty: 0 | Refills: 0 | DISCHARGE

## 2019-06-06 RX ORDER — BUDESONIDE AND FORMOTEROL FUMARATE DIHYDRATE 160; 4.5 UG/1; UG/1
2 AEROSOL RESPIRATORY (INHALATION)
Qty: 0 | Refills: 0 | DISCHARGE

## 2019-08-21 ENCOUNTER — APPOINTMENT (OUTPATIENT)
Dept: OPHTHALMOLOGY | Facility: CLINIC | Age: 84
End: 2019-08-21

## 2024-04-11 PROBLEM — J44.9 CHRONIC OBSTRUCTIVE PULMONARY DISEASE, UNSPECIFIED: Chronic | Status: ACTIVE | Noted: 2019-06-02

## 2024-04-11 PROBLEM — K22.5 DIVERTICULUM OF ESOPHAGUS, ACQUIRED: Chronic | Status: ACTIVE | Noted: 2019-06-03

## 2024-04-11 PROBLEM — N40.0 BENIGN PROSTATIC HYPERPLASIA WITHOUT LOWER URINARY TRACT SYMPTOMS: Chronic | Status: ACTIVE | Noted: 2019-06-03

## 2024-05-02 DIAGNOSIS — Z00.00 ENCOUNTER FOR GENERAL ADULT MEDICAL EXAMINATION W/OUT ABNORMAL FINDINGS: ICD-10-CM

## 2024-05-06 ENCOUNTER — APPOINTMENT (OUTPATIENT)
Dept: UROLOGY | Facility: CLINIC | Age: 89
End: 2024-05-06

## 2024-05-08 ENCOUNTER — APPOINTMENT (OUTPATIENT)
Dept: SURGERY | Facility: CLINIC | Age: 89
End: 2024-05-08

## 2024-05-08 VITALS
SYSTOLIC BLOOD PRESSURE: 109 MMHG | OXYGEN SATURATION: 95 % | DIASTOLIC BLOOD PRESSURE: 63 MMHG | HEART RATE: 80 BPM | TEMPERATURE: 98 F

## 2024-05-08 DIAGNOSIS — R19.00 INTRA-ABDOMINAL AND PELVIC SWELLING, MASS AND LUMP, UNSPECIFIED SITE: ICD-10-CM

## 2024-05-08 PROCEDURE — 99203 OFFICE O/P NEW LOW 30 MIN: CPT | Mod: 57

## 2024-05-08 PROCEDURE — 21931 EXC BACK LES SC 3 CM/>: CPT

## 2024-05-08 RX ORDER — ATORVASTATIN CALCIUM 80 MG/1
TABLET, FILM COATED ORAL
Refills: 0 | Status: ACTIVE | COMMUNITY

## 2024-05-08 RX ORDER — LISINOPRIL 30 MG/1
TABLET ORAL
Refills: 0 | Status: ACTIVE | COMMUNITY

## 2024-05-08 RX ORDER — METOPROLOL SUCCINATE 200 MG/1
TABLET, EXTENDED RELEASE ORAL
Refills: 0 | Status: ACTIVE | COMMUNITY

## 2024-05-08 RX ORDER — BUSPIRONE HYDROCHLORIDE 10 MG/1
10 TABLET ORAL
Refills: 0 | Status: ACTIVE | COMMUNITY

## 2024-06-05 ENCOUNTER — TRANSCRIPTION ENCOUNTER (OUTPATIENT)
Age: 89
End: 2024-06-05

## 2024-06-06 NOTE — PROCEDURE
[FreeTextEntry1] : Patient was placed in right lateral position and left flank was prepped and draped in the usual fashion. 1% lidocaine was injected around the mass  4cc total.  An elliptical  incision was made around the mass and it was removed from subcutaneous tissue. Hemostasis was achieved and the skin was closed with 3-0 vicryl and 4-0 Monocryl.  Steri strip applied.  Specimen sent for pathologic evaluation.

## 2024-06-17 LAB — CORE LAB BIOPSY: NORMAL

## 2024-08-13 NOTE — ED PROVIDER NOTE - CARDIAC, MLM
AFTER THE TEST  A steri-strip and bandage will be placed over the incision. You may shower after 24 hours. Remove bandage after 24 hours. Remove bandage after the shower. Leave the steri-strips in place to fall off on their own. If after 1 week the steri-strips are still on, you may remove them. Avoid swimming or soaking in tub for 3 days.     You may have mild discomfort at the test site. If needed, you may take Tylenol (Acetaminophen) for pain. Please avoid taking NSAIDs, Motrin, Advil, Aleve, or ibuprofen for 24 hours following the biopsy. After 24 hours you may resume NSAIDSs.     If you take aspirin, Plavix, Coumadin, Xarelto or Eliquis please tell us. If these medications were stopped by your provider, please ask them when to resume.     You may have some tenderness, bruising or slight bleeding at the site. Please apply ice packs to the site for 15 minutes on and 15 minutes off for a 2 hour minimum.     Most people can return to their usual routine after the procedure. Avoid Strenuous activity for 24 hours.     Sleep in a bra the night after your biopsy. Continue to do so for comfort.     Call your provider if you have any of the following symptoms :  Fever  Increased pain  Increased bleeding  Redness  Increased swelling  Yellowish drainage  Your provider will get the biopsy results within 5 - 7 days. Call your provider with any questions.     Patient education brochure and pain/comfort measures have been reviewed.   Phone number provided to contact [facility] if problems arise.     Patient verbalized understanding of home going instructions.    Patient left breast center ambulatory at 1230.  
Normal rate, regular rhythm.  Heart sounds S1, S2.  No murmurs, rubs or gallops.